# Patient Record
Sex: MALE | Race: OTHER | HISPANIC OR LATINO | URBAN - METROPOLITAN AREA
[De-identification: names, ages, dates, MRNs, and addresses within clinical notes are randomized per-mention and may not be internally consistent; named-entity substitution may affect disease eponyms.]

---

## 2017-05-28 ENCOUNTER — EMERGENCY (EMERGENCY)
Facility: HOSPITAL | Age: 37
LOS: 1 days | Discharge: HOME | End: 2017-05-28
Attending: EMERGENCY MEDICINE
Payer: MEDICAID

## 2017-05-28 VITALS
TEMPERATURE: 99 F | HEART RATE: 102 BPM | RESPIRATION RATE: 19 BRPM | WEIGHT: 134.92 LBS | DIASTOLIC BLOOD PRESSURE: 58 MMHG | HEIGHT: 67 IN | SYSTOLIC BLOOD PRESSURE: 104 MMHG | OXYGEN SATURATION: 99 %

## 2017-05-28 VITALS
DIASTOLIC BLOOD PRESSURE: 52 MMHG | TEMPERATURE: 100 F | OXYGEN SATURATION: 100 % | RESPIRATION RATE: 19 BRPM | SYSTOLIC BLOOD PRESSURE: 94 MMHG | HEART RATE: 80 BPM

## 2017-05-28 LAB
ALBUMIN SERPL ELPH-MCNC: 3.7 G/DL — SIGNIFICANT CHANGE UP (ref 3.5–5)
ALP SERPL-CCNC: 71 U/L — SIGNIFICANT CHANGE UP (ref 40–120)
ALT FLD-CCNC: 37 U/L DA — SIGNIFICANT CHANGE UP (ref 10–60)
ANION GAP SERPL CALC-SCNC: 6 MMOL/L — SIGNIFICANT CHANGE UP (ref 5–17)
APTT BLD: 29.9 SEC — SIGNIFICANT CHANGE UP (ref 27.5–37.4)
AST SERPL-CCNC: 23 U/L — SIGNIFICANT CHANGE UP (ref 10–40)
BILIRUB SERPL-MCNC: 0.5 MG/DL — SIGNIFICANT CHANGE UP (ref 0.2–1.2)
BUN SERPL-MCNC: 9 MG/DL — SIGNIFICANT CHANGE UP (ref 7–18)
CALCIUM SERPL-MCNC: 8.6 MG/DL — SIGNIFICANT CHANGE UP (ref 8.4–10.5)
CHLORIDE SERPL-SCNC: 104 MMOL/L — SIGNIFICANT CHANGE UP (ref 96–108)
CO2 SERPL-SCNC: 28 MMOL/L — SIGNIFICANT CHANGE UP (ref 22–31)
CREAT SERPL-MCNC: 1.05 MG/DL — SIGNIFICANT CHANGE UP (ref 0.5–1.3)
GLUCOSE SERPL-MCNC: 101 MG/DL — HIGH (ref 70–99)
HCT VFR BLD CALC: 42.3 % — SIGNIFICANT CHANGE UP (ref 39–50)
HGB BLD-MCNC: 14.4 G/DL — SIGNIFICANT CHANGE UP (ref 13–17)
INR BLD: 1.26 RATIO — HIGH (ref 0.88–1.16)
LIDOCAIN IGE QN: 244 U/L — SIGNIFICANT CHANGE UP (ref 73–393)
MAGNESIUM SERPL-MCNC: 1.7 MG/DL — SIGNIFICANT CHANGE UP (ref 1.6–2.6)
MCHC RBC-ENTMCNC: 32.7 PG — SIGNIFICANT CHANGE UP (ref 27–34)
MCHC RBC-ENTMCNC: 34 GM/DL — SIGNIFICANT CHANGE UP (ref 32–36)
MCV RBC AUTO: 96.2 FL — SIGNIFICANT CHANGE UP (ref 80–100)
PHOSPHATE SERPL-MCNC: 2.1 MG/DL — LOW (ref 2.5–4.5)
PLATELET # BLD AUTO: 141 K/UL — LOW (ref 150–400)
POTASSIUM SERPL-MCNC: 3.5 MMOL/L — SIGNIFICANT CHANGE UP (ref 3.5–5.3)
POTASSIUM SERPL-SCNC: 3.5 MMOL/L — SIGNIFICANT CHANGE UP (ref 3.5–5.3)
PROT SERPL-MCNC: 7.6 G/DL — SIGNIFICANT CHANGE UP (ref 6–8.3)
PROTHROM AB SERPL-ACNC: 13.8 SEC — HIGH (ref 9.8–12.7)
RBC # BLD: 4.4 M/UL — SIGNIFICANT CHANGE UP (ref 4.2–5.8)
RBC # FLD: 12.4 % — SIGNIFICANT CHANGE UP (ref 10.3–14.5)
SODIUM SERPL-SCNC: 138 MMOL/L — SIGNIFICANT CHANGE UP (ref 135–145)
WBC # BLD: 11.2 K/UL — HIGH (ref 3.8–10.5)
WBC # FLD AUTO: 11.2 K/UL — HIGH (ref 3.8–10.5)

## 2017-05-28 PROCEDURE — 84100 ASSAY OF PHOSPHORUS: CPT

## 2017-05-28 PROCEDURE — 74177 CT ABD & PELVIS W/CONTRAST: CPT

## 2017-05-28 PROCEDURE — 83735 ASSAY OF MAGNESIUM: CPT

## 2017-05-28 PROCEDURE — 85730 THROMBOPLASTIN TIME PARTIAL: CPT

## 2017-05-28 PROCEDURE — 85610 PROTHROMBIN TIME: CPT

## 2017-05-28 PROCEDURE — 74177 CT ABD & PELVIS W/CONTRAST: CPT | Mod: 26

## 2017-05-28 PROCEDURE — 83690 ASSAY OF LIPASE: CPT

## 2017-05-28 PROCEDURE — 99284 EMERGENCY DEPT VISIT MOD MDM: CPT | Mod: 25

## 2017-05-28 PROCEDURE — 85027 COMPLETE CBC AUTOMATED: CPT

## 2017-05-28 PROCEDURE — 96375 TX/PRO/DX INJ NEW DRUG ADDON: CPT | Mod: 76

## 2017-05-28 PROCEDURE — 96374 THER/PROPH/DIAG INJ IV PUSH: CPT | Mod: XU

## 2017-05-28 PROCEDURE — 80053 COMPREHEN METABOLIC PANEL: CPT

## 2017-05-28 RX ORDER — SODIUM CHLORIDE 9 MG/ML
1000 INJECTION INTRAMUSCULAR; INTRAVENOUS; SUBCUTANEOUS ONCE
Qty: 0 | Refills: 0 | Status: COMPLETED | OUTPATIENT
Start: 2017-05-28 | End: 2017-05-28

## 2017-05-28 RX ORDER — SODIUM,POTASSIUM PHOSPHATES 278-250MG
250 POWDER IN PACKET (EA) ORAL ONCE
Qty: 0 | Refills: 0 | Status: DISCONTINUED | OUTPATIENT
Start: 2017-05-28 | End: 2017-05-28

## 2017-05-28 RX ORDER — CIPROFLOXACIN LACTATE 400MG/40ML
500 VIAL (ML) INTRAVENOUS ONCE
Qty: 0 | Refills: 0 | Status: COMPLETED | OUTPATIENT
Start: 2017-05-28 | End: 2017-05-28

## 2017-05-28 RX ORDER — SODIUM,POTASSIUM PHOSPHATES 278-250MG
1 POWDER IN PACKET (EA) ORAL ONCE
Qty: 0 | Refills: 0 | Status: COMPLETED | OUTPATIENT
Start: 2017-05-28 | End: 2017-05-28

## 2017-05-28 RX ORDER — METRONIDAZOLE 500 MG
1 TABLET ORAL
Qty: 30 | Refills: 0
Start: 2017-05-28 | End: 2017-06-07

## 2017-05-28 RX ORDER — DIPHENHYDRAMINE HCL 50 MG
25 CAPSULE ORAL EVERY 4 HOURS
Qty: 0 | Refills: 0 | Status: DISCONTINUED | OUTPATIENT
Start: 2017-05-28 | End: 2017-06-01

## 2017-05-28 RX ORDER — ACETAMINOPHEN 500 MG
650 TABLET ORAL ONCE
Qty: 0 | Refills: 0 | Status: COMPLETED | OUTPATIENT
Start: 2017-05-28 | End: 2017-05-28

## 2017-05-28 RX ORDER — ONDANSETRON 8 MG/1
1 TABLET, FILM COATED ORAL
Qty: 30 | Refills: 0
Start: 2017-05-28

## 2017-05-28 RX ORDER — KETOROLAC TROMETHAMINE 30 MG/ML
60 SYRINGE (ML) INJECTION ONCE
Qty: 0 | Refills: 0 | Status: DISCONTINUED | OUTPATIENT
Start: 2017-05-28 | End: 2017-05-28

## 2017-05-28 RX ORDER — METRONIDAZOLE 500 MG
500 TABLET ORAL ONCE
Qty: 0 | Refills: 0 | Status: COMPLETED | OUTPATIENT
Start: 2017-05-28 | End: 2017-05-28

## 2017-05-28 RX ORDER — ONDANSETRON 8 MG/1
4 TABLET, FILM COATED ORAL ONCE
Qty: 0 | Refills: 0 | Status: COMPLETED | OUTPATIENT
Start: 2017-05-28 | End: 2017-05-28

## 2017-05-28 RX ORDER — FAMOTIDINE 10 MG/ML
20 INJECTION INTRAVENOUS ONCE
Qty: 0 | Refills: 0 | Status: COMPLETED | OUTPATIENT
Start: 2017-05-28 | End: 2017-05-28

## 2017-05-28 RX ORDER — MOXIFLOXACIN HYDROCHLORIDE TABLETS, 400 MG 400 MG/1
1 TABLET, FILM COATED ORAL
Qty: 20 | Refills: 0
Start: 2017-05-28 | End: 2017-06-07

## 2017-05-28 RX ORDER — METOCLOPRAMIDE HCL 10 MG
10 TABLET ORAL ONCE
Qty: 0 | Refills: 0 | Status: COMPLETED | OUTPATIENT
Start: 2017-05-28 | End: 2017-05-28

## 2017-05-28 RX ADMIN — Medication 650 MILLIGRAM(S): at 08:30

## 2017-05-28 RX ADMIN — SODIUM CHLORIDE 1000 MILLILITER(S): 9 INJECTION INTRAMUSCULAR; INTRAVENOUS; SUBCUTANEOUS at 04:43

## 2017-05-28 RX ADMIN — FAMOTIDINE 20 MILLIGRAM(S): 10 INJECTION INTRAVENOUS at 04:43

## 2017-05-28 RX ADMIN — Medication 10 MILLIGRAM(S): at 07:44

## 2017-05-28 RX ADMIN — Medication 1 TABLET(S): at 07:45

## 2017-05-28 RX ADMIN — Medication 500 MILLIGRAM(S): at 11:29

## 2017-05-28 RX ADMIN — Medication 650 MILLIGRAM(S): at 08:05

## 2017-05-28 RX ADMIN — Medication 500 MILLIGRAM(S): at 11:32

## 2017-05-28 RX ADMIN — ONDANSETRON 4 MILLIGRAM(S): 8 TABLET, FILM COATED ORAL at 11:32

## 2017-05-28 RX ADMIN — ONDANSETRON 4 MILLIGRAM(S): 8 TABLET, FILM COATED ORAL at 04:43

## 2017-05-28 NOTE — ED PROVIDER NOTE - PROGRESS NOTE DETAILS
Pt continues to c/o of severe epigastric. Febrile to 101F. Asking for Klonopin. Will proceed with CAT scan, give Tylenol, and IV Valium. Reassess. I, Ramila Culp, am scribing for and in the presence of Dr. Radha Peguero in the following sections: PROGRESS NOTES. be pharmacist called bce of qtc prolongation with celexa and cipro, I reviewed ekgs - prior qtx are 430, and 390.  no real concern for adverse reaction

## 2017-05-28 NOTE — ED PROVIDER NOTE - MEDICAL DECISION MAKING DETAILS
36 y/o M pt with abd pain, nausea, vomiting and diarrhea x yesterday. Will give IVF,  Zofran, Pepcid and reassess.

## 2017-05-28 NOTE — ED PROVIDER NOTE - OBJECTIVE STATEMENT
38 y/o M pt with PMHx of anxiety, depression and panic attack, presents to ED c/o epigastric abd pain with nausea, vomiting and diarrhea x yesterday. Pt notes also experiencing chest tightness but relates it to his anxiety. Pt states he has not been able to take anxiety medication due to vomiting. As per pt, pt was seen in the ED for similar Sx 1 year ago. Pt denies fever, chills, dysuria, urinary frequency, hematuria, or any other complaints. NKDA. CURRENT MEDICATIONS: Clonopin and Celexa

## 2017-05-28 NOTE — ED PROVIDER NOTE - NS ED MD SCRIBE ATTENDING SCRIBE SECTIONS
PAST MEDICAL/SURGICAL/SOCIAL HISTORY/HISTORY OF PRESENT ILLNESS/PHYSICAL EXAM/VITAL SIGNS( Pullset)/DISPOSITION/HIV/REVIEW OF SYSTEMS

## 2017-06-01 DIAGNOSIS — K29.70 GASTRITIS, UNSPECIFIED, WITHOUT BLEEDING: ICD-10-CM

## 2017-06-01 DIAGNOSIS — F41.9 ANXIETY DISORDER, UNSPECIFIED: ICD-10-CM

## 2017-06-01 DIAGNOSIS — F41.0 PANIC DISORDER [EPISODIC PAROXYSMAL ANXIETY]: ICD-10-CM

## 2017-06-01 DIAGNOSIS — K52.82 EOSINOPHILIC COLITIS: ICD-10-CM

## 2017-06-01 DIAGNOSIS — F32.9 MAJOR DEPRESSIVE DISORDER, SINGLE EPISODE, UNSPECIFIED: ICD-10-CM

## 2019-09-15 ENCOUNTER — TRANSCRIPTION ENCOUNTER (OUTPATIENT)
Age: 39
End: 2019-09-15

## 2020-02-07 ENCOUNTER — EMERGENCY (EMERGENCY)
Facility: HOSPITAL | Age: 40
LOS: 1 days | Discharge: HOME | End: 2020-02-07
Attending: EMERGENCY MEDICINE
Payer: MEDICAID

## 2020-02-07 VITALS
DIASTOLIC BLOOD PRESSURE: 89 MMHG | TEMPERATURE: 98 F | WEIGHT: 147.05 LBS | SYSTOLIC BLOOD PRESSURE: 134 MMHG | OXYGEN SATURATION: 98 % | HEART RATE: 108 BPM | RESPIRATION RATE: 19 BRPM | HEIGHT: 67 IN

## 2020-02-07 LAB
ALBUMIN SERPL ELPH-MCNC: 4.3 G/DL — SIGNIFICANT CHANGE UP (ref 3.5–5)
ALP SERPL-CCNC: 78 U/L — SIGNIFICANT CHANGE UP (ref 40–120)
ALT FLD-CCNC: 51 U/L DA — SIGNIFICANT CHANGE UP (ref 10–60)
ANION GAP SERPL CALC-SCNC: 9 MMOL/L — SIGNIFICANT CHANGE UP (ref 5–17)
AST SERPL-CCNC: 27 U/L — SIGNIFICANT CHANGE UP (ref 10–40)
BASOPHILS # BLD AUTO: 0 K/UL — SIGNIFICANT CHANGE UP (ref 0–0.2)
BASOPHILS NFR BLD AUTO: 0 % — SIGNIFICANT CHANGE UP (ref 0–2)
BILIRUB SERPL-MCNC: 1 MG/DL — SIGNIFICANT CHANGE UP (ref 0.2–1.2)
BUN SERPL-MCNC: 13 MG/DL — SIGNIFICANT CHANGE UP (ref 7–18)
CALCIUM SERPL-MCNC: 9 MG/DL — SIGNIFICANT CHANGE UP (ref 8.4–10.5)
CHLORIDE SERPL-SCNC: 105 MMOL/L — SIGNIFICANT CHANGE UP (ref 96–108)
CO2 SERPL-SCNC: 25 MMOL/L — SIGNIFICANT CHANGE UP (ref 22–31)
CREAT SERPL-MCNC: 0.97 MG/DL — SIGNIFICANT CHANGE UP (ref 0.5–1.3)
EOSINOPHIL # BLD AUTO: 0.11 K/UL — SIGNIFICANT CHANGE UP (ref 0–0.5)
EOSINOPHIL NFR BLD AUTO: 1 % — SIGNIFICANT CHANGE UP (ref 0–6)
GLUCOSE SERPL-MCNC: 97 MG/DL — SIGNIFICANT CHANGE UP (ref 70–99)
HCT VFR BLD CALC: 46.9 % — SIGNIFICANT CHANGE UP (ref 39–50)
HGB BLD-MCNC: 15.9 G/DL — SIGNIFICANT CHANGE UP (ref 13–17)
LACTATE SERPL-SCNC: 1.3 MMOL/L — SIGNIFICANT CHANGE UP (ref 0.7–2)
LIDOCAIN IGE QN: 129 U/L — SIGNIFICANT CHANGE UP (ref 73–393)
LYMPHOCYTES # BLD AUTO: 0.22 K/UL — LOW (ref 1–3.3)
LYMPHOCYTES # BLD AUTO: 2 % — LOW (ref 13–44)
MANUAL SMEAR VERIFICATION: SIGNIFICANT CHANGE UP
MCHC RBC-ENTMCNC: 31.5 PG — SIGNIFICANT CHANGE UP (ref 27–34)
MCHC RBC-ENTMCNC: 33.9 GM/DL — SIGNIFICANT CHANGE UP (ref 32–36)
MCV RBC AUTO: 92.9 FL — SIGNIFICANT CHANGE UP (ref 80–100)
MONOCYTES # BLD AUTO: 0.87 K/UL — SIGNIFICANT CHANGE UP (ref 0–0.9)
MONOCYTES NFR BLD AUTO: 8 % — SIGNIFICANT CHANGE UP (ref 2–14)
NEUTROPHILS # BLD AUTO: 9.72 K/UL — HIGH (ref 1.8–7.4)
NEUTROPHILS NFR BLD AUTO: 85 % — HIGH (ref 43–77)
NEUTS BAND # BLD: 4 % — SIGNIFICANT CHANGE UP (ref 0–8)
NRBC # BLD: 0 /100 — SIGNIFICANT CHANGE UP (ref 0–0)
PLAT MORPH BLD: NORMAL — SIGNIFICANT CHANGE UP
PLATELET # BLD AUTO: 202 K/UL — SIGNIFICANT CHANGE UP (ref 150–400)
POTASSIUM SERPL-MCNC: 3.6 MMOL/L — SIGNIFICANT CHANGE UP (ref 3.5–5.3)
POTASSIUM SERPL-SCNC: 3.6 MMOL/L — SIGNIFICANT CHANGE UP (ref 3.5–5.3)
PROT SERPL-MCNC: 8.5 G/DL — HIGH (ref 6–8.3)
RBC # BLD: 5.05 M/UL — SIGNIFICANT CHANGE UP (ref 4.2–5.8)
RBC # FLD: 11.6 % — SIGNIFICANT CHANGE UP (ref 10.3–14.5)
RBC BLD AUTO: NORMAL — SIGNIFICANT CHANGE UP
SODIUM SERPL-SCNC: 139 MMOL/L — SIGNIFICANT CHANGE UP (ref 135–145)
WBC # BLD: 10.92 K/UL — HIGH (ref 3.8–10.5)
WBC # FLD AUTO: 10.92 K/UL — HIGH (ref 3.8–10.5)

## 2020-02-07 PROCEDURE — 85027 COMPLETE CBC AUTOMATED: CPT

## 2020-02-07 PROCEDURE — 80053 COMPREHEN METABOLIC PANEL: CPT

## 2020-02-07 PROCEDURE — 36415 COLL VENOUS BLD VENIPUNCTURE: CPT

## 2020-02-07 PROCEDURE — 83605 ASSAY OF LACTIC ACID: CPT

## 2020-02-07 PROCEDURE — 99284 EMERGENCY DEPT VISIT MOD MDM: CPT

## 2020-02-07 PROCEDURE — 99284 EMERGENCY DEPT VISIT MOD MDM: CPT | Mod: 25

## 2020-02-07 PROCEDURE — 96375 TX/PRO/DX INJ NEW DRUG ADDON: CPT

## 2020-02-07 PROCEDURE — 83690 ASSAY OF LIPASE: CPT

## 2020-02-07 PROCEDURE — 96374 THER/PROPH/DIAG INJ IV PUSH: CPT

## 2020-02-07 RX ORDER — KETOROLAC TROMETHAMINE 30 MG/ML
15 SYRINGE (ML) INJECTION ONCE
Refills: 0 | Status: DISCONTINUED | OUTPATIENT
Start: 2020-02-07 | End: 2020-02-07

## 2020-02-07 RX ORDER — SODIUM CHLORIDE 9 MG/ML
1000 INJECTION INTRAMUSCULAR; INTRAVENOUS; SUBCUTANEOUS ONCE
Refills: 0 | Status: COMPLETED | OUTPATIENT
Start: 2020-02-07 | End: 2020-02-07

## 2020-02-07 RX ORDER — ONDANSETRON 8 MG/1
4 TABLET, FILM COATED ORAL ONCE
Refills: 0 | Status: COMPLETED | OUTPATIENT
Start: 2020-02-07 | End: 2020-02-07

## 2020-02-07 RX ADMIN — Medication 15 MILLIGRAM(S): at 21:19

## 2020-02-07 RX ADMIN — ONDANSETRON 4 MILLIGRAM(S): 8 TABLET, FILM COATED ORAL at 21:19

## 2020-02-07 RX ADMIN — SODIUM CHLORIDE 1000 MILLILITER(S): 9 INJECTION INTRAMUSCULAR; INTRAVENOUS; SUBCUTANEOUS at 21:24

## 2020-02-07 RX ADMIN — Medication 30 MILLILITER(S): at 21:20

## 2020-02-07 NOTE — ED PROVIDER NOTE - PROGRESS NOTE DETAILS
[place]: tolerated po.  work up shows no lytes abnormality  dx gastroenteritis. slowly advance diet. left ambulatory.  return precautions given.

## 2020-02-07 NOTE — ED ADULT NURSE NOTE - OBJECTIVE STATEMENT
pt is a 38 y/o male  w/ c/o abdominal pain  abdomen soft  non distended  + BS noted . states he is feeling nauseaus and vomiting.

## 2020-02-07 NOTE — ED PROVIDER NOTE - PATIENT PORTAL LINK FT
You can access the FollowMyHealth Patient Portal offered by [facility] by registering at the following website: [email or web address]. By joining [employer]’s FollowMyHealth portal, you will also be able to view your health information using other applications (apps) compatible with our system.

## 2020-02-07 NOTE — ED PROVIDER NOTE - CLINICAL SUMMARY MEDICAL DECISION MAKING FREE TEXT BOX
39 yr old male with hx of anxiety presents to ed c/o diarrhea non bloody with vomiting nbnb now abd cramping since am.  daughter with sx first.  + myalgia, no cough, no travel , no drug use, no etoh, no abx use.    gastroenteritis.  hydrate, labs, zofran, toradol, re-assess

## 2020-02-07 NOTE — ED PROVIDER NOTE - ENMT, MLM
Airway patent, Nasal mucosa clear. Mouth with normal mucosa. Throat has no vesicles, no oropharyngeal exudates and uvula is midline. no thrush

## 2020-02-07 NOTE — ED PROVIDER NOTE - OBJECTIVE STATEMENT
39 yr old male with hx of anxiety presents to ed c/o diarrhea non bloody with vomiting nbnb now abd cramping since am.  daughter with sx first.  + myalgia, no cough, no travel , no drug use, no etoh, no abx use.

## 2020-02-07 NOTE — ED ADULT NURSE NOTE - NSIMPLEMENTINTERV_GEN_ALL_ED
Implemented All Universal Safety Interventions:  [place] to call system. Call bell, personal items and telephone within reach. Instruct patient to call for assistance. Room bathroom lighting operational. Non-slip footwear when patient is off stretcher. Physically safe environment: no spills, clutter or unnecessary equipment. Stretcher in lowest position, wheels locked, appropriate side rails in place.

## 2021-04-03 ENCOUNTER — INPATIENT (INPATIENT)
Facility: HOSPITAL | Age: 41
LOS: 1 days | Discharge: HOME | DRG: 343 | End: 2021-04-05
Attending: SURGERY | Admitting: SURGERY
Payer: MEDICAID

## 2021-04-03 ENCOUNTER — TRANSCRIPTION ENCOUNTER (OUTPATIENT)
Age: 41
End: 2021-04-03

## 2021-04-03 VITALS
RESPIRATION RATE: 18 BRPM | WEIGHT: 154.98 LBS | TEMPERATURE: 98 F | SYSTOLIC BLOOD PRESSURE: 123 MMHG | OXYGEN SATURATION: 98 % | HEIGHT: 67 IN | DIASTOLIC BLOOD PRESSURE: 74 MMHG | HEART RATE: 64 BPM

## 2021-04-03 PROCEDURE — 99285 EMERGENCY DEPT VISIT HI MDM: CPT

## 2021-04-03 RX ORDER — SODIUM CHLORIDE 9 MG/ML
1000 INJECTION INTRAMUSCULAR; INTRAVENOUS; SUBCUTANEOUS ONCE
Refills: 0 | Status: COMPLETED | OUTPATIENT
Start: 2021-04-03 | End: 2021-04-03

## 2021-04-03 RX ORDER — ONDANSETRON 8 MG/1
4 TABLET, FILM COATED ORAL ONCE
Refills: 0 | Status: COMPLETED | OUTPATIENT
Start: 2021-04-03 | End: 2021-04-03

## 2021-04-03 RX ORDER — FAMOTIDINE 10 MG/ML
20 INJECTION INTRAVENOUS ONCE
Refills: 0 | Status: COMPLETED | OUTPATIENT
Start: 2021-04-03 | End: 2021-04-03

## 2021-04-03 RX ORDER — SUCRALFATE 1 G
1 TABLET ORAL ONCE
Refills: 0 | Status: COMPLETED | OUTPATIENT
Start: 2021-04-03 | End: 2021-04-03

## 2021-04-04 ENCOUNTER — RESULT REVIEW (OUTPATIENT)
Age: 41
End: 2021-04-04

## 2021-04-04 ENCOUNTER — TRANSCRIPTION ENCOUNTER (OUTPATIENT)
Age: 41
End: 2021-04-04

## 2021-04-04 DIAGNOSIS — K35.80 UNSPECIFIED ACUTE APPENDICITIS: ICD-10-CM

## 2021-04-04 LAB
ALBUMIN SERPL ELPH-MCNC: 3.6 G/DL — SIGNIFICANT CHANGE UP (ref 3.5–5)
ALP SERPL-CCNC: 74 U/L — SIGNIFICANT CHANGE UP (ref 40–120)
ALT FLD-CCNC: 41 U/L DA — SIGNIFICANT CHANGE UP (ref 10–60)
ANION GAP SERPL CALC-SCNC: 5 MMOL/L — SIGNIFICANT CHANGE UP (ref 5–17)
APTT BLD: 28.3 SEC — SIGNIFICANT CHANGE UP (ref 27.5–35.5)
AST SERPL-CCNC: 22 U/L — SIGNIFICANT CHANGE UP (ref 10–40)
BASOPHILS # BLD AUTO: 0.04 K/UL — SIGNIFICANT CHANGE UP (ref 0–0.2)
BASOPHILS NFR BLD AUTO: 0.3 % — SIGNIFICANT CHANGE UP (ref 0–2)
BILIRUB SERPL-MCNC: 0.2 MG/DL — SIGNIFICANT CHANGE UP (ref 0.2–1.2)
BUN SERPL-MCNC: 18 MG/DL — SIGNIFICANT CHANGE UP (ref 7–18)
CALCIUM SERPL-MCNC: 8.7 MG/DL — SIGNIFICANT CHANGE UP (ref 8.4–10.5)
CHLORIDE SERPL-SCNC: 103 MMOL/L — SIGNIFICANT CHANGE UP (ref 96–108)
CO2 SERPL-SCNC: 29 MMOL/L — SIGNIFICANT CHANGE UP (ref 22–31)
CREAT SERPL-MCNC: 1.03 MG/DL — SIGNIFICANT CHANGE UP (ref 0.5–1.3)
EOSINOPHIL # BLD AUTO: 0.25 K/UL — SIGNIFICANT CHANGE UP (ref 0–0.5)
EOSINOPHIL NFR BLD AUTO: 2.1 % — SIGNIFICANT CHANGE UP (ref 0–6)
GLUCOSE SERPL-MCNC: 89 MG/DL — SIGNIFICANT CHANGE UP (ref 70–99)
HCT VFR BLD CALC: 42.2 % — SIGNIFICANT CHANGE UP (ref 39–50)
HGB BLD-MCNC: 14.2 G/DL — SIGNIFICANT CHANGE UP (ref 13–17)
IMM GRANULOCYTES NFR BLD AUTO: 0.3 % — SIGNIFICANT CHANGE UP (ref 0–1.5)
INR BLD: 1.09 RATIO — SIGNIFICANT CHANGE UP (ref 0.88–1.16)
LIDOCAIN IGE QN: 223 U/L — SIGNIFICANT CHANGE UP (ref 73–393)
LYMPHOCYTES # BLD AUTO: 16.8 % — SIGNIFICANT CHANGE UP (ref 13–44)
LYMPHOCYTES # BLD AUTO: 2.01 K/UL — SIGNIFICANT CHANGE UP (ref 1–3.3)
MCHC RBC-ENTMCNC: 31.4 PG — SIGNIFICANT CHANGE UP (ref 27–34)
MCHC RBC-ENTMCNC: 33.6 GM/DL — SIGNIFICANT CHANGE UP (ref 32–36)
MCV RBC AUTO: 93.4 FL — SIGNIFICANT CHANGE UP (ref 80–100)
MONOCYTES # BLD AUTO: 0.76 K/UL — SIGNIFICANT CHANGE UP (ref 0–0.9)
MONOCYTES NFR BLD AUTO: 6.4 % — SIGNIFICANT CHANGE UP (ref 2–14)
NEUTROPHILS # BLD AUTO: 8.87 K/UL — HIGH (ref 1.8–7.4)
NEUTROPHILS NFR BLD AUTO: 74.1 % — SIGNIFICANT CHANGE UP (ref 43–77)
NRBC # BLD: 0 /100 WBCS — SIGNIFICANT CHANGE UP (ref 0–0)
PLATELET # BLD AUTO: 210 K/UL — SIGNIFICANT CHANGE UP (ref 150–400)
POTASSIUM SERPL-MCNC: 3.6 MMOL/L — SIGNIFICANT CHANGE UP (ref 3.5–5.3)
POTASSIUM SERPL-SCNC: 3.6 MMOL/L — SIGNIFICANT CHANGE UP (ref 3.5–5.3)
PROT SERPL-MCNC: 7.9 G/DL — SIGNIFICANT CHANGE UP (ref 6–8.3)
PROTHROM AB SERPL-ACNC: 12.9 SEC — SIGNIFICANT CHANGE UP (ref 10.6–13.6)
RBC # BLD: 4.52 M/UL — SIGNIFICANT CHANGE UP (ref 4.2–5.8)
RBC # FLD: 11.5 % — SIGNIFICANT CHANGE UP (ref 10.3–14.5)
SARS-COV-2 RNA SPEC QL NAA+PROBE: SIGNIFICANT CHANGE UP
SODIUM SERPL-SCNC: 137 MMOL/L — SIGNIFICANT CHANGE UP (ref 135–145)
WBC # BLD: 11.96 K/UL — HIGH (ref 3.8–10.5)
WBC # FLD AUTO: 11.96 K/UL — HIGH (ref 3.8–10.5)

## 2021-04-04 PROCEDURE — 93010 ELECTROCARDIOGRAM REPORT: CPT

## 2021-04-04 PROCEDURE — 99222 1ST HOSP IP/OBS MODERATE 55: CPT | Mod: 57

## 2021-04-04 PROCEDURE — 74177 CT ABD & PELVIS W/CONTRAST: CPT | Mod: 26,MA

## 2021-04-04 PROCEDURE — 44970 LAPAROSCOPY APPENDECTOMY: CPT

## 2021-04-04 PROCEDURE — 88304 TISSUE EXAM BY PATHOLOGIST: CPT | Mod: 26

## 2021-04-04 PROCEDURE — 44970 LAPAROSCOPY APPENDECTOMY: CPT | Mod: AS

## 2021-04-04 RX ORDER — SODIUM CHLORIDE 9 MG/ML
1000 INJECTION, SOLUTION INTRAVENOUS
Refills: 0 | Status: DISCONTINUED | OUTPATIENT
Start: 2021-04-04 | End: 2021-04-04

## 2021-04-04 RX ORDER — CEFOTETAN DISODIUM 1 G
2 VIAL (EA) INJECTION ONCE
Refills: 0 | Status: COMPLETED | OUTPATIENT
Start: 2021-04-04 | End: 2021-04-04

## 2021-04-04 RX ORDER — ONDANSETRON 8 MG/1
4 TABLET, FILM COATED ORAL ONCE
Refills: 0 | Status: DISCONTINUED | OUTPATIENT
Start: 2021-04-04 | End: 2021-04-04

## 2021-04-04 RX ORDER — DEXTROSE MONOHYDRATE, SODIUM CHLORIDE, AND POTASSIUM CHLORIDE 50; .745; 4.5 G/1000ML; G/1000ML; G/1000ML
1000 INJECTION, SOLUTION INTRAVENOUS
Refills: 0 | Status: DISCONTINUED | OUTPATIENT
Start: 2021-04-04 | End: 2021-04-04

## 2021-04-04 RX ORDER — ACETAMINOPHEN 500 MG
1000 TABLET ORAL ONCE
Refills: 0 | Status: COMPLETED | OUTPATIENT
Start: 2021-04-04 | End: 2021-04-04

## 2021-04-04 RX ORDER — HYDROMORPHONE HYDROCHLORIDE 2 MG/ML
0.5 INJECTION INTRAMUSCULAR; INTRAVENOUS; SUBCUTANEOUS
Refills: 0 | Status: DISCONTINUED | OUTPATIENT
Start: 2021-04-04 | End: 2021-04-04

## 2021-04-04 RX ORDER — CEFOTETAN DISODIUM 1 G
1 VIAL (EA) INJECTION EVERY 12 HOURS
Refills: 0 | Status: DISCONTINUED | OUTPATIENT
Start: 2021-04-04 | End: 2021-04-05

## 2021-04-04 RX ORDER — ONDANSETRON 8 MG/1
4 TABLET, FILM COATED ORAL EVERY 6 HOURS
Refills: 0 | Status: DISCONTINUED | OUTPATIENT
Start: 2021-04-04 | End: 2021-04-04

## 2021-04-04 RX ORDER — MORPHINE SULFATE 50 MG/1
2 CAPSULE, EXTENDED RELEASE ORAL EVERY 6 HOURS
Refills: 0 | Status: DISCONTINUED | OUTPATIENT
Start: 2021-04-04 | End: 2021-04-05

## 2021-04-04 RX ORDER — KETOROLAC TROMETHAMINE 30 MG/ML
30 SYRINGE (ML) INJECTION EVERY 6 HOURS
Refills: 0 | Status: DISCONTINUED | OUTPATIENT
Start: 2021-04-04 | End: 2021-04-04

## 2021-04-04 RX ORDER — ENOXAPARIN SODIUM 100 MG/ML
40 INJECTION SUBCUTANEOUS DAILY
Refills: 0 | Status: DISCONTINUED | OUTPATIENT
Start: 2021-04-04 | End: 2021-04-05

## 2021-04-04 RX ORDER — ONDANSETRON 8 MG/1
4 TABLET, FILM COATED ORAL EVERY 6 HOURS
Refills: 0 | Status: DISCONTINUED | OUTPATIENT
Start: 2021-04-04 | End: 2021-04-05

## 2021-04-04 RX ORDER — KETOROLAC TROMETHAMINE 30 MG/ML
30 SYRINGE (ML) INJECTION EVERY 6 HOURS
Refills: 0 | Status: DISCONTINUED | OUTPATIENT
Start: 2021-04-04 | End: 2021-04-05

## 2021-04-04 RX ORDER — SODIUM CHLORIDE 9 MG/ML
1000 INJECTION, SOLUTION INTRAVENOUS
Refills: 0 | Status: DISCONTINUED | OUTPATIENT
Start: 2021-04-04 | End: 2021-04-05

## 2021-04-04 RX ORDER — MORPHINE SULFATE 50 MG/1
4 CAPSULE, EXTENDED RELEASE ORAL ONCE
Refills: 0 | Status: DISCONTINUED | OUTPATIENT
Start: 2021-04-04 | End: 2021-04-04

## 2021-04-04 RX ORDER — FENTANYL CITRATE 50 UG/ML
25 INJECTION INTRAVENOUS
Refills: 0 | Status: DISCONTINUED | OUTPATIENT
Start: 2021-04-04 | End: 2021-04-04

## 2021-04-04 RX ADMIN — Medication 100 GRAM(S): at 03:29

## 2021-04-04 RX ADMIN — SODIUM CHLORIDE 999 MILLILITER(S): 9 INJECTION, SOLUTION INTRAVENOUS at 05:13

## 2021-04-04 RX ADMIN — Medication 30 MILLILITER(S): at 00:00

## 2021-04-04 RX ADMIN — Medication 2 GRAM(S): at 06:37

## 2021-04-04 RX ADMIN — MORPHINE SULFATE 4 MILLIGRAM(S): 50 CAPSULE, EXTENDED RELEASE ORAL at 00:48

## 2021-04-04 RX ADMIN — Medication 30 MILLIGRAM(S): at 06:11

## 2021-04-04 RX ADMIN — MORPHINE SULFATE 2 MILLIGRAM(S): 50 CAPSULE, EXTENDED RELEASE ORAL at 14:09

## 2021-04-04 RX ADMIN — FAMOTIDINE 20 MILLIGRAM(S): 10 INJECTION INTRAVENOUS at 00:00

## 2021-04-04 RX ADMIN — Medication 100 GRAM(S): at 17:28

## 2021-04-04 RX ADMIN — MORPHINE SULFATE 2 MILLIGRAM(S): 50 CAPSULE, EXTENDED RELEASE ORAL at 21:09

## 2021-04-04 RX ADMIN — Medication 400 MILLIGRAM(S): at 02:33

## 2021-04-04 RX ADMIN — DEXTROSE MONOHYDRATE, SODIUM CHLORIDE, AND POTASSIUM CHLORIDE 110 MILLILITER(S): 50; .745; 4.5 INJECTION, SOLUTION INTRAVENOUS at 06:12

## 2021-04-04 RX ADMIN — SODIUM CHLORIDE 1000 MILLILITER(S): 9 INJECTION INTRAMUSCULAR; INTRAVENOUS; SUBCUTANEOUS at 00:00

## 2021-04-04 RX ADMIN — ONDANSETRON 4 MILLIGRAM(S): 8 TABLET, FILM COATED ORAL at 00:00

## 2021-04-04 RX ADMIN — Medication 1000 MILLIGRAM(S): at 03:29

## 2021-04-04 RX ADMIN — MORPHINE SULFATE 2 MILLIGRAM(S): 50 CAPSULE, EXTENDED RELEASE ORAL at 14:30

## 2021-04-04 RX ADMIN — MORPHINE SULFATE 4 MILLIGRAM(S): 50 CAPSULE, EXTENDED RELEASE ORAL at 00:49

## 2021-04-04 RX ADMIN — Medication 1000 MILLIGRAM(S): at 03:06

## 2021-04-04 RX ADMIN — Medication 30 MILLIGRAM(S): at 06:13

## 2021-04-04 RX ADMIN — Medication 1 GRAM(S): at 00:00

## 2021-04-04 RX ADMIN — MORPHINE SULFATE 2 MILLIGRAM(S): 50 CAPSULE, EXTENDED RELEASE ORAL at 21:25

## 2021-04-04 NOTE — DISCHARGE NOTE PROVIDER - NSDCCPCAREPLAN_GEN_ALL_CORE_FT
PRINCIPAL DISCHARGE DIAGNOSIS  Diagnosis: Acute appendicitis, unspecified acute appendicitis type  Assessment and Plan of Treatment: Please follow-up with your surgeon in 1 week. Drink plenty of fluids and rest as needed. Call for any fever over 101, nausea, vomiting, severe pain, no passing of gas or bowel movement.   DIET  You may resume your regular diet as normal.   SURGICAL SITES  Remove outer dressing and keep white steri-strips in place allowing them to fall off on their own. You may shower 48 hours post-operatively but do not bathe or soak in the water for 1-2 weeks; pat dry. If you notice any signs of surgical site infection (ie. redness, swelling, pain, pus drainage), please seek medical care immediately.   ACTIVITY  Do not lift anything heavier than 10 pounds for 2 weeks and avoid strenuous activity for 4-6 weeks.   PAIN CONTROL  You may take Motrin 600mg (with food) or Tylenol 650mg as needed for mild pain. Stagger one medication 3 hours after the other for maximum pain control. Maximum daily dose of Tylenol should not exceed 4grams/day.

## 2021-04-04 NOTE — H&P ADULT - NSHPPHYSICALEXAM_GEN_ALL_CORE
Vital Signs Last 24 Hrs  T(C): 36.4 (04 Apr 2021 06:01), Max: 36.6 (03 Apr 2021 22:45)  T(F): 97.6 (04 Apr 2021 06:01), Max: 97.8 (03 Apr 2021 22:45)  HR: 61 (04 Apr 2021 06:01) (61 - 64)  BP: 102/61 (04 Apr 2021 06:01) (102/61 - 123/74)  BP(mean): --  RR: 16 (04 Apr 2021 06:01) (16 - 18)  SpO2: 97% (04 Apr 2021 06:01) (97% - 98%)    General:  A&Ox3,Appears stated age, No acute distress,  Head: NC/AT  EENT: PERRLA. EOMI. Conjunctiva and sclera clear. Pharynx clear.  Neck: Supple. No JVD  Lungs: CTA B/l. Nonlabored Respirations  CV: +S1S2, RRR  Abdomen: Soft, Nondistended,  + RLQ McBurney's point tenderness, no guarding, no rebound  Extremities: Warm and well perfused. 2+ peripheral pulses b/l. Calf soft, nontender b/l. No pedal edema.

## 2021-04-04 NOTE — H&P ADULT - NSHPLABSRESULTS_GEN_ALL_CORE
14.2   11.96 )-----------( 210      ( 03 Apr 2021 23:56 )             42.2   04-03    137  |  103  |  18  ----------------------------<  89  3.6   |  29  |  1.03    Ca    8.7      03 Apr 2021 23:56    TPro  7.9  /  Alb  3.6  /  TBili  0.2  /  DBili  x   /  AST  22  /  ALT  41  /  AlkPhos  74  04-03      < from: CT Abdomen and Pelvis w/ IV Cont (04.04.21 @ 02:01) >    FINDINGS:  LOWER CHEST: Within normal limits.    LIVER: Focal hepatic steatosis adjacent to the falciform ligament.  BILE DUCTS: Normal caliber.  GALLBLADDER: Within normal limits.  SPLEEN: Within normal limits.  PANCREAS: Within normal limits.  ADRENALS: Within normal limits.  KIDNEYS/URETERS: Within normal limits.    BLADDER: Decompressed, limiting evaluation  REPRODUCTIVE ORGANS: Prostate is normal in size    BOWEL: No bowel obstruction. Appendix is fluid-filled, mildly dilated and hyperemic with adjacent fat stranding. No abscess or extraluminal air.  PERITONEUM: Trace ascites in the pelvis  VESSELS: Normal caliber  RETROPERITONEUM/LYMPH NODES: No hemorrhage or adenopathy  ABDOMINAL WALL: Unremarkable  BONES: No acute osseous abnormality    IMPRESSION:  Acute, uncomplicated appendicitis.      < end of copied text >

## 2021-04-04 NOTE — H&P ADULT - HISTORY OF PRESENT ILLNESS
41 y/o male h/o anxiety, depression, gastritis, presents with epigastric pain starting approximately 12 hours ago. Pt noting nausea. No V/D. No chest pain. No lower abdominal pain. 39 y/o male with PMHx of anxiety, depression, presents to the ED with Abdominal Pain x 2 days . Pain started suddenly first located in the periumbilical area but now residing in the RLQ area described as sharp , persistent and disabling associated with nausea . Denies fever, chills, vomiting, diarrhea SOB, chest pain or any other complaints at this time .

## 2021-04-04 NOTE — ED PROVIDER NOTE - CLINICAL SUMMARY MEDICAL DECISION MAKING FREE TEXT BOX
Pt presenting with epigastric abdominal pain. Will check labs. Will assess for LFT elevations and pancreatitis. Given pain control. Will consider imaging if pain persists or if labs are abnormal.

## 2021-04-04 NOTE — DISCHARGE NOTE PROVIDER - HOSPITAL COURSE
40M with PMHx of anxiety, depression, presents to the ED with Abdominal Pain x 2 days . Pain started suddenly first located in the periumbilical area but now residing in the RLQ area described as sharp , persistent and disabling associated with nausea . Denies fever, chills, vomiting, diarrhea SOB, chest pain or any other complaints at this time. Pt underwent a CT scan which showed acute uncomplicated appendicitis. Pt was admitted to the hospital and underwent a laparoscopic appendectomy 4/4/21. Pt tolerated the procedure well. Diet was advanced and tolerated and patient was stable for discharge the following day with appropriate follow-up.

## 2021-04-04 NOTE — ED PROVIDER NOTE - OBJECTIVE STATEMENT
39 y/o male h/o anxiety, depression, gastritis, presents with epigastric pain starting approximately 12 hours ago. Pt noting nausea. No V/D. No chest pain. No lower abdominal pain.

## 2021-04-04 NOTE — ED ADULT NURSE REASSESSMENT NOTE - NS ED NURSE REASSESS COMMENT FT1
6 am pt complaining of pain in the abdomen , toradol given with good relief . LR bolus finished . iv started with ds.45 fluid infusing good . pt sleeping now , no distress noted ,report given to day shift rn

## 2021-04-04 NOTE — DISCHARGE NOTE PROVIDER - NSDCMRMEDTOKEN_GEN_ALL_CORE_FT
Klonopin 1 mg oral tablet:  orally  X 2 DAY    ketorolac 10 mg oral tablet: 1 tab(s) orally 4 times a day, As Needed -for moderate pain MDD:4   Klonopin 1 mg oral tablet:  orally  X 2 DAY

## 2021-04-04 NOTE — DISCHARGE NOTE PROVIDER - CARE PROVIDER_API CALL
Dipak Singh  General Surgery  [address] [address] [place] [address]  Phone: [phone]  Fax: [phone]  Follow Up Time:

## 2021-04-04 NOTE — H&P ADULT - ASSESSMENT
39 y/o male with PMHx of anxiety, depression, with RLQ Abdominal Pain x 2 days c/w Acute uncomplicated Appendicitis. Afebrile, Mild leucocytosis. Will require surgical intervention     Admit to Dr Singh surgical service   NPO, IVF  IV ABx  pain control   f/up labs   DVT PPx

## 2021-04-05 ENCOUNTER — TRANSCRIPTION ENCOUNTER (OUTPATIENT)
Age: 41
End: 2021-04-05

## 2021-04-05 VITALS
OXYGEN SATURATION: 99 % | DIASTOLIC BLOOD PRESSURE: 66 MMHG | SYSTOLIC BLOOD PRESSURE: 108 MMHG | TEMPERATURE: 98 F | HEART RATE: 75 BPM | RESPIRATION RATE: 17 BRPM

## 2021-04-05 LAB
ANION GAP SERPL CALC-SCNC: 9 MMOL/L — SIGNIFICANT CHANGE UP (ref 5–17)
BASOPHILS # BLD AUTO: 0.02 K/UL — SIGNIFICANT CHANGE UP (ref 0–0.2)
BASOPHILS NFR BLD AUTO: 0.3 % — SIGNIFICANT CHANGE UP (ref 0–2)
BUN SERPL-MCNC: 8 MG/DL — SIGNIFICANT CHANGE UP (ref 7–18)
CALCIUM SERPL-MCNC: 9 MG/DL — SIGNIFICANT CHANGE UP (ref 8.4–10.5)
CHLORIDE SERPL-SCNC: 105 MMOL/L — SIGNIFICANT CHANGE UP (ref 96–108)
CO2 SERPL-SCNC: 25 MMOL/L — SIGNIFICANT CHANGE UP (ref 22–31)
COVID-19 SPIKE DOMAIN AB INTERP: NEGATIVE — SIGNIFICANT CHANGE UP
COVID-19 SPIKE DOMAIN ANTIBODY RESULT: 0.4 U/ML — SIGNIFICANT CHANGE UP
CREAT SERPL-MCNC: 1.04 MG/DL — SIGNIFICANT CHANGE UP (ref 0.5–1.3)
EOSINOPHIL # BLD AUTO: 0.27 K/UL — SIGNIFICANT CHANGE UP (ref 0–0.5)
EOSINOPHIL NFR BLD AUTO: 4.6 % — SIGNIFICANT CHANGE UP (ref 0–6)
GLUCOSE SERPL-MCNC: 88 MG/DL — SIGNIFICANT CHANGE UP (ref 70–99)
HCT VFR BLD CALC: 40.9 % — SIGNIFICANT CHANGE UP (ref 39–50)
HGB BLD-MCNC: 13.7 G/DL — SIGNIFICANT CHANGE UP (ref 13–17)
IMM GRANULOCYTES NFR BLD AUTO: 0.2 % — SIGNIFICANT CHANGE UP (ref 0–1.5)
LYMPHOCYTES # BLD AUTO: 1.68 K/UL — SIGNIFICANT CHANGE UP (ref 1–3.3)
LYMPHOCYTES # BLD AUTO: 28.7 % — SIGNIFICANT CHANGE UP (ref 13–44)
MCHC RBC-ENTMCNC: 31.6 PG — SIGNIFICANT CHANGE UP (ref 27–34)
MCHC RBC-ENTMCNC: 33.5 GM/DL — SIGNIFICANT CHANGE UP (ref 32–36)
MCV RBC AUTO: 94.2 FL — SIGNIFICANT CHANGE UP (ref 80–100)
MONOCYTES # BLD AUTO: 0.56 K/UL — SIGNIFICANT CHANGE UP (ref 0–0.9)
MONOCYTES NFR BLD AUTO: 9.6 % — SIGNIFICANT CHANGE UP (ref 2–14)
NEUTROPHILS # BLD AUTO: 3.31 K/UL — SIGNIFICANT CHANGE UP (ref 1.8–7.4)
NEUTROPHILS NFR BLD AUTO: 56.6 % — SIGNIFICANT CHANGE UP (ref 43–77)
NRBC # BLD: 0 /100 WBCS — SIGNIFICANT CHANGE UP (ref 0–0)
PLATELET # BLD AUTO: 192 K/UL — SIGNIFICANT CHANGE UP (ref 150–400)
POTASSIUM SERPL-MCNC: 3.7 MMOL/L — SIGNIFICANT CHANGE UP (ref 3.5–5.3)
POTASSIUM SERPL-SCNC: 3.7 MMOL/L — SIGNIFICANT CHANGE UP (ref 3.5–5.3)
RBC # BLD: 4.34 M/UL — SIGNIFICANT CHANGE UP (ref 4.2–5.8)
RBC # FLD: 11.9 % — SIGNIFICANT CHANGE UP (ref 10.3–14.5)
SARS-COV-2 IGG+IGM SERPL QL IA: 0.4 U/ML — SIGNIFICANT CHANGE UP
SARS-COV-2 IGG+IGM SERPL QL IA: NEGATIVE — SIGNIFICANT CHANGE UP
SODIUM SERPL-SCNC: 139 MMOL/L — SIGNIFICANT CHANGE UP (ref 135–145)
WBC # BLD: 5.85 K/UL — SIGNIFICANT CHANGE UP (ref 3.8–10.5)
WBC # FLD AUTO: 5.85 K/UL — SIGNIFICANT CHANGE UP (ref 3.8–10.5)

## 2021-04-05 PROCEDURE — 86769 SARS-COV-2 COVID-19 ANTIBODY: CPT

## 2021-04-05 PROCEDURE — 93005 ELECTROCARDIOGRAM TRACING: CPT

## 2021-04-05 PROCEDURE — 96374 THER/PROPH/DIAG INJ IV PUSH: CPT

## 2021-04-05 PROCEDURE — 36415 COLL VENOUS BLD VENIPUNCTURE: CPT

## 2021-04-05 PROCEDURE — 88304 TISSUE EXAM BY PATHOLOGIST: CPT

## 2021-04-05 PROCEDURE — C9399: CPT

## 2021-04-05 PROCEDURE — 85730 THROMBOPLASTIN TIME PARTIAL: CPT

## 2021-04-05 PROCEDURE — 80053 COMPREHEN METABOLIC PANEL: CPT

## 2021-04-05 PROCEDURE — 74177 CT ABD & PELVIS W/CONTRAST: CPT

## 2021-04-05 PROCEDURE — 96375 TX/PRO/DX INJ NEW DRUG ADDON: CPT

## 2021-04-05 PROCEDURE — 85025 COMPLETE CBC W/AUTO DIFF WBC: CPT

## 2021-04-05 PROCEDURE — 86900 BLOOD TYPING SEROLOGIC ABO: CPT

## 2021-04-05 PROCEDURE — 87635 SARS-COV-2 COVID-19 AMP PRB: CPT

## 2021-04-05 PROCEDURE — 86901 BLOOD TYPING SEROLOGIC RH(D): CPT

## 2021-04-05 PROCEDURE — 99285 EMERGENCY DEPT VISIT HI MDM: CPT | Mod: 25

## 2021-04-05 PROCEDURE — 80048 BASIC METABOLIC PNL TOTAL CA: CPT

## 2021-04-05 PROCEDURE — 85610 PROTHROMBIN TIME: CPT

## 2021-04-05 PROCEDURE — 86850 RBC ANTIBODY SCREEN: CPT

## 2021-04-05 PROCEDURE — 83690 ASSAY OF LIPASE: CPT

## 2021-04-05 PROCEDURE — C1889: CPT

## 2021-04-05 RX ORDER — KETOROLAC TROMETHAMINE 30 MG/ML
1 SYRINGE (ML) INJECTION
Qty: 12 | Refills: 0
Start: 2021-04-05 | End: 2021-04-09

## 2021-04-05 RX ORDER — TRAMADOL HYDROCHLORIDE 50 MG/1
1 TABLET ORAL
Qty: 8 | Refills: 0
Start: 2021-04-05

## 2021-04-05 RX ORDER — POLYETHYLENE GLYCOL 3350 17 G/17G
17 POWDER, FOR SOLUTION ORAL ONCE
Refills: 0 | Status: COMPLETED | OUTPATIENT
Start: 2021-04-05 | End: 2021-04-05

## 2021-04-05 RX ADMIN — Medication 100 GRAM(S): at 17:17

## 2021-04-05 RX ADMIN — ENOXAPARIN SODIUM 40 MILLIGRAM(S): 100 INJECTION SUBCUTANEOUS at 11:03

## 2021-04-05 RX ADMIN — Medication 10 MILLIGRAM(S): at 12:34

## 2021-04-05 RX ADMIN — POLYETHYLENE GLYCOL 3350 17 GRAM(S): 17 POWDER, FOR SOLUTION ORAL at 09:42

## 2021-04-05 RX ADMIN — MORPHINE SULFATE 2 MILLIGRAM(S): 50 CAPSULE, EXTENDED RELEASE ORAL at 17:17

## 2021-04-05 RX ADMIN — Medication 100 GRAM(S): at 05:01

## 2021-04-05 RX ADMIN — MORPHINE SULFATE 2 MILLIGRAM(S): 50 CAPSULE, EXTENDED RELEASE ORAL at 04:34

## 2021-04-05 RX ADMIN — MORPHINE SULFATE 2 MILLIGRAM(S): 50 CAPSULE, EXTENDED RELEASE ORAL at 05:01

## 2021-04-05 NOTE — PROGRESS NOTE ADULT - ASSESSMENT
40y.o. Male s/p lap appy POD#1    -Add miralax  -d/c today
41 yo s/p laparoscopic appendectomy day 1. Pt is doing well. Tolerating diet. Voided and ambulating. Positive flatus. Vitals are stable and pain in minimal. Incisions are dry and intact.    Plan:  Discharge home

## 2021-04-05 NOTE — DISCHARGE NOTE NURSING/CASE MANAGEMENT/SOCIAL WORK - PATIENT PORTAL LINK FT
You can access the FollowMyHealth Patient Portal offered by [facility] by registering at the following website: [email or web address]. By joining [employer]’s FollowMyHealth portal, you will also be able to view your health information using other applications (apps) compatible with our system.

## 2021-04-05 NOTE — PROGRESS NOTE ADULT - SUBJECTIVE AND OBJECTIVE BOX
INTERVAL HPI/OVERNIGHT EVENTS:  Pt resting comfortably. Having difficulty straining to defecate.   Tolerating diet.   +flatus/-BM.   Denies N/V.    MEDICATIONS  (STANDING):  cefoTEtan  IVPB 1 Gram(s) IV Intermittent every 12 hours  enoxaparin Injectable 40 milliGRAM(s) SubCutaneous daily  polyethylene glycol 3350 17 Gram(s) Oral once  sodium chloride 0.45%. 1000 milliLiter(s) (110 mL/Hr) IV Continuous <Continuous>    MEDICATIONS  (PRN):  ketorolac   Injectable 30 milliGRAM(s) IV Push every 6 hours PRN Moderate Pain (4 - 6)  morphine  - Injectable 2 milliGRAM(s) IV Push every 6 hours PRN Severe Pain (7 - 10)  ondansetron Injectable 4 milliGRAM(s) IV Push every 6 hours PRN Nausea    Vital Signs Last 24 Hrs  T(C): 36.6 (05 Apr 2021 05:26), Max: 36.8 (04 Apr 2021 20:40)  T(F): 97.8 (05 Apr 2021 05:26), Max: 98.3 (04 Apr 2021 20:40)  HR: 56 (05 Apr 2021 05:26) (56 - 78)  BP: 107/55 (05 Apr 2021 05:26) (106/59 - 136/61)  BP(mean): 66 (05 Apr 2021 05:26) (66 - 87)  RR: 17 (05 Apr 2021 05:26) (13 - 19)  SpO2: 93% (05 Apr 2021 05:26) (93% - 100%)    Physical:  General: A&Ox3. NAD.  Abdomen: Soft nondistended, Dressing C/D/I.    I&O's Detail    04 Apr 2021 07:01  -  05 Apr 2021 07:00  --------------------------------------------------------  IN:    Lactated Ringers Bolus: 1500 mL  Total IN: 1500 mL    OUT:    Estimated Blood Loss (mL): 10 mL  Total OUT: 10 mL    Total NET: 1490 mL    LABS:                        13.7   5.85  )-----------( 192      ( 05 Apr 2021 05:57 )             40.9             04-05    139  |  105  |  8   ----------------------------<  88  3.7   |  25  |  1.04    Ca    9.0      05 Apr 2021 05:57    TPro  7.9  /  Alb  3.6  /  TBili  0.2  /  DBili  x   /  AST  22  /  ALT  41  /  AlkPhos  74  04-03      
INTERVAL HPI/OVERNIGHT EVENTS:  Pt resting comfortably. No acute complaints. Tolerating diet. Positive flatus. Denies N/V. Ambulating and voiding.    MEDICATIONS  (STANDING):  cefoTEtan  IVPB 1 Gram(s) IV Intermittent every 12 hours  enoxaparin Injectable 40 milliGRAM(s) SubCutaneous daily  sodium chloride 0.45%. 1000 milliLiter(s) (110 mL/Hr) IV Continuous <Continuous>    MEDICATIONS  (PRN):  ketorolac   Injectable 30 milliGRAM(s) IV Push every 6 hours PRN Moderate Pain (4 - 6)  morphine  - Injectable 2 milliGRAM(s) IV Push every 6 hours PRN Severe Pain (7 - 10)  ondansetron Injectable 4 milliGRAM(s) IV Push every 6 hours PRN Nausea      Vital Signs Last 24 Hrs  T(C): 36.6 (05 Apr 2021 05:26), Max: 36.8 (04 Apr 2021 20:40)  T(F): 97.8 (05 Apr 2021 05:26), Max: 98.3 (04 Apr 2021 20:40)  HR: 56 (05 Apr 2021 05:26) (56 - 78)  BP: 107/55 (05 Apr 2021 05:26) (106/59 - 136/61)  BP(mean): 66 (05 Apr 2021 05:26) (66 - 87)  RR: 17 (05 Apr 2021 05:26) (13 - 19)  SpO2: 93% (05 Apr 2021 05:26) (93% - 100%)    Physical:  General: A&Ox3. NAD. Pain is under control.   Abdomen: Soft nondistended, nontender.    I&O's Detail    04 Apr 2021 07:01  -  05 Apr 2021 06:14  --------------------------------------------------------  IN:    Lactated Ringers Bolus: 1500 mL  Total IN: 1500 mL    OUT:    Estimated Blood Loss (mL): 10 mL  Total OUT: 10 mL    Total NET: 1490 mL          LABS:                        13.7   5.85  )-----------( 192      ( 05 Apr 2021 05:57 )             40.9             04-03    137  |  103  |  18  ----------------------------<  89  3.6   |  29  |  1.03    Ca    8.7      03 Apr 2021 23:56    TPro  7.9  /  Alb  3.6  /  TBili  0.2  /  DBili  x   /  AST  22  /  ALT  41  /  AlkPhos  74  04-03      40y.o. Male
pt seen and examined; tender to RLQ and CT scan confirms acute appendicitis. For IR, risks, benefits alternatives discussed, all questions answered agrees to proceed.

## 2021-04-07 LAB — SURGICAL PATHOLOGY STUDY: SIGNIFICANT CHANGE UP

## 2021-04-21 ENCOUNTER — APPOINTMENT (OUTPATIENT)
Dept: SURGERY | Facility: CLINIC | Age: 41
End: 2021-04-21
Payer: MEDICAID

## 2021-04-21 VITALS
BODY MASS INDEX: 23.54 KG/M2 | HEART RATE: 63 BPM | DIASTOLIC BLOOD PRESSURE: 69 MMHG | WEIGHT: 150 LBS | SYSTOLIC BLOOD PRESSURE: 103 MMHG | HEIGHT: 67 IN

## 2021-04-21 DIAGNOSIS — K35.80 UNSPECIFIED ACUTE APPENDICITIS: ICD-10-CM

## 2021-04-21 DIAGNOSIS — Z56.0 UNEMPLOYMENT, UNSPECIFIED: ICD-10-CM

## 2021-04-21 DIAGNOSIS — F32.9 MAJOR DEPRESSIVE DISORDER, SINGLE EPISODE, UNSPECIFIED: ICD-10-CM

## 2021-04-21 DIAGNOSIS — Z78.9 OTHER SPECIFIED HEALTH STATUS: ICD-10-CM

## 2021-04-21 DIAGNOSIS — F41.9 ANXIETY DISORDER, UNSPECIFIED: ICD-10-CM

## 2021-04-21 PROCEDURE — 99024 POSTOP FOLLOW-UP VISIT: CPT

## 2021-04-21 RX ORDER — ESCITALOPRAM OXALATE 5 MG/1
TABLET, FILM COATED ORAL
Refills: 0 | Status: ACTIVE | COMMUNITY

## 2021-04-21 RX ORDER — CLONAZEPAM 2 MG/1
TABLET ORAL
Refills: 0 | Status: ACTIVE | COMMUNITY

## 2021-04-21 SDOH — ECONOMIC STABILITY - INCOME SECURITY: UNEMPLOYMENT, UNSPECIFIED: Z56.0

## 2021-05-11 NOTE — HISTORY OF PRESENT ILLNESS
[de-identified] : SEKOU WOOD is a 40 year old male who presents in the office for postop and post hospitalization visit. Patient was admitted to [facility] for Acute appendicitis. He underwent a Laparoscopic appendectomy on 04/04/2021 pathology results are consistent with Acute suppurative appendicitis with periappendicitis. Today patient is doing well, offers no complaints. Denies any fevers, chills, nausea, vomiting, diarrhea or constipation. Patient able to tolerate regular diet with normal bowel movements. Surgical incisions are healing well. No sign of inflammation or exudate. Patient stated that pain is improved. Patient denies any pain at present time \par

## 2021-05-11 NOTE — PHYSICAL EXAM
[No Rash or Lesion] : No rash or lesion [Alert] : alert [Oriented to Person] : oriented to person [Oriented to Place] : oriented to place [Oriented to Time] : oriented to time [Calm] : calm [de-identified] : The patient is alert, well-groomed, well developed and cheerful.  [de-identified] : Breath sounds equal and bilateral, no wheezing no rales or rhonchi  [de-identified] :  good S1, S2, no m/r/g bilateral  [de-identified] : Normoactive bowel sounds, soft and nontender, no hepatosplenomegaly or masses noted, Patient incision sites healing well  [de-identified] : WNL

## 2021-05-11 NOTE — ASSESSMENT
[FreeTextEntry1] : SEKOU WOOD is a 40 year old male who underwent Laparoscopic appendectomy \par \par Patient is doing well, with expected post-operative recovery. All surgical incisions are healing well and as expected. There is no evidence of infection or complication, and patient is progressing as expected. Post-operative wound care, activity, restrictions and precautions reinforced. path discussed. Patient was instructed no heavy lifting  4 weeks. Patient's questions and concerns addressed to patient's satisfaction. \par

## 2021-10-13 NOTE — DISCHARGE NOTE NURSING/CASE MANAGEMENT/SOCIAL WORK - NSDPLANG ASIS_GEN_ALL_CORE
No
.  Requiring mechanical ventilation, severe respiratory distress that poses a threat to life  -c/w Abx for PNA  -patient rescinded DNI status on admission

## 2024-01-02 ENCOUNTER — NON-APPOINTMENT (OUTPATIENT)
Age: 44
End: 2024-01-02

## 2024-01-23 ENCOUNTER — EMERGENCY (EMERGENCY)
Facility: HOSPITAL | Age: 44
LOS: 1 days | Discharge: HOME | End: 2024-01-23
Attending: EMERGENCY MEDICINE
Payer: COMMERCIAL

## 2024-01-23 VITALS
DIASTOLIC BLOOD PRESSURE: 62 MMHG | TEMPERATURE: 98 F | RESPIRATION RATE: 16 BRPM | OXYGEN SATURATION: 98 % | SYSTOLIC BLOOD PRESSURE: 97 MMHG | HEART RATE: 83 BPM

## 2024-01-23 VITALS
TEMPERATURE: 98 F | SYSTOLIC BLOOD PRESSURE: 107 MMHG | HEART RATE: 68 BPM | RESPIRATION RATE: 16 BRPM | HEIGHT: 67 IN | WEIGHT: 134.48 LBS | DIASTOLIC BLOOD PRESSURE: 80 MMHG | OXYGEN SATURATION: 97 %

## 2024-01-23 LAB
ALBUMIN SERPL ELPH-MCNC: 3.5 G/DL — SIGNIFICANT CHANGE UP (ref 3.5–5)
ALP SERPL-CCNC: 62 U/L — SIGNIFICANT CHANGE UP (ref 40–120)
ALT FLD-CCNC: 58 U/L DA — SIGNIFICANT CHANGE UP (ref 10–60)
ANION GAP SERPL CALC-SCNC: 4 MMOL/L — LOW (ref 5–17)
APPEARANCE UR: CLEAR — SIGNIFICANT CHANGE UP
AST SERPL-CCNC: 29 U/L — SIGNIFICANT CHANGE UP (ref 10–40)
BASOPHILS # BLD AUTO: 0.02 K/UL — SIGNIFICANT CHANGE UP (ref 0–0.2)
BASOPHILS NFR BLD AUTO: 0.4 % — SIGNIFICANT CHANGE UP (ref 0–2)
BILIRUB SERPL-MCNC: 0.5 MG/DL — SIGNIFICANT CHANGE UP (ref 0.2–1.2)
BILIRUB UR-MCNC: NEGATIVE — SIGNIFICANT CHANGE UP
BUN SERPL-MCNC: 15 MG/DL — SIGNIFICANT CHANGE UP (ref 7–18)
CALCIUM SERPL-MCNC: 9.1 MG/DL — SIGNIFICANT CHANGE UP (ref 8.4–10.5)
CHLORIDE SERPL-SCNC: 105 MMOL/L — SIGNIFICANT CHANGE UP (ref 96–108)
CO2 SERPL-SCNC: 28 MMOL/L — SIGNIFICANT CHANGE UP (ref 22–31)
COLOR SPEC: YELLOW — SIGNIFICANT CHANGE UP
CREAT SERPL-MCNC: 0.83 MG/DL — SIGNIFICANT CHANGE UP (ref 0.5–1.3)
DIFF PNL FLD: NEGATIVE — SIGNIFICANT CHANGE UP
EGFR: 113 ML/MIN/1.73M2 — SIGNIFICANT CHANGE UP
EOSINOPHIL # BLD AUTO: 0.44 K/UL — SIGNIFICANT CHANGE UP (ref 0–0.5)
EOSINOPHIL NFR BLD AUTO: 8.1 % — HIGH (ref 0–6)
FLUAV AG NPH QL: SIGNIFICANT CHANGE UP
FLUBV AG NPH QL: SIGNIFICANT CHANGE UP
GLUCOSE SERPL-MCNC: 88 MG/DL — SIGNIFICANT CHANGE UP (ref 70–99)
GLUCOSE UR QL: NEGATIVE MG/DL — SIGNIFICANT CHANGE UP
HCT VFR BLD CALC: 38.2 % — LOW (ref 39–50)
HGB BLD-MCNC: 12.9 G/DL — LOW (ref 13–17)
IMM GRANULOCYTES NFR BLD AUTO: 0.2 % — SIGNIFICANT CHANGE UP (ref 0–0.9)
KETONES UR-MCNC: NEGATIVE MG/DL — SIGNIFICANT CHANGE UP
LACTATE SERPL-SCNC: 0.5 MMOL/L — LOW (ref 0.7–2)
LEUKOCYTE ESTERASE UR-ACNC: NEGATIVE — SIGNIFICANT CHANGE UP
LIDOCAIN IGE QN: 54 U/L — SIGNIFICANT CHANGE UP (ref 13–75)
LYMPHOCYTES # BLD AUTO: 2.2 K/UL — SIGNIFICANT CHANGE UP (ref 1–3.3)
LYMPHOCYTES # BLD AUTO: 40.4 % — SIGNIFICANT CHANGE UP (ref 13–44)
MCHC RBC-ENTMCNC: 30.8 PG — SIGNIFICANT CHANGE UP (ref 27–34)
MCHC RBC-ENTMCNC: 33.8 GM/DL — SIGNIFICANT CHANGE UP (ref 32–36)
MCV RBC AUTO: 91.2 FL — SIGNIFICANT CHANGE UP (ref 80–100)
MONOCYTES # BLD AUTO: 0.46 K/UL — SIGNIFICANT CHANGE UP (ref 0–0.9)
MONOCYTES NFR BLD AUTO: 8.4 % — SIGNIFICANT CHANGE UP (ref 2–14)
NEUTROPHILS # BLD AUTO: 2.32 K/UL — SIGNIFICANT CHANGE UP (ref 1.8–7.4)
NEUTROPHILS NFR BLD AUTO: 42.5 % — LOW (ref 43–77)
NITRITE UR-MCNC: NEGATIVE — SIGNIFICANT CHANGE UP
NRBC # BLD: 0 /100 WBCS — SIGNIFICANT CHANGE UP (ref 0–0)
PH UR: 5.5 — SIGNIFICANT CHANGE UP (ref 5–8)
PLATELET # BLD AUTO: 215 K/UL — SIGNIFICANT CHANGE UP (ref 150–400)
POTASSIUM SERPL-MCNC: 3.5 MMOL/L — SIGNIFICANT CHANGE UP (ref 3.5–5.3)
POTASSIUM SERPL-SCNC: 3.5 MMOL/L — SIGNIFICANT CHANGE UP (ref 3.5–5.3)
PROT SERPL-MCNC: 7.4 G/DL — SIGNIFICANT CHANGE UP (ref 6–8.3)
PROT UR-MCNC: NEGATIVE MG/DL — SIGNIFICANT CHANGE UP
RBC # BLD: 4.19 M/UL — LOW (ref 4.2–5.8)
RBC # FLD: 11.9 % — SIGNIFICANT CHANGE UP (ref 10.3–14.5)
SARS-COV-2 RNA SPEC QL NAA+PROBE: SIGNIFICANT CHANGE UP
SODIUM SERPL-SCNC: 137 MMOL/L — SIGNIFICANT CHANGE UP (ref 135–145)
SP GR SPEC: 1.02 — SIGNIFICANT CHANGE UP (ref 1–1.03)
UROBILINOGEN FLD QL: 1 MG/DL — SIGNIFICANT CHANGE UP (ref 0.2–1)
WBC # BLD: 5.45 K/UL — SIGNIFICANT CHANGE UP (ref 3.8–10.5)
WBC # FLD AUTO: 5.45 K/UL — SIGNIFICANT CHANGE UP (ref 3.8–10.5)

## 2024-01-23 PROCEDURE — 99285 EMERGENCY DEPT VISIT HI MDM: CPT

## 2024-01-23 PROCEDURE — 96366 THER/PROPH/DIAG IV INF ADDON: CPT

## 2024-01-23 PROCEDURE — 36415 COLL VENOUS BLD VENIPUNCTURE: CPT

## 2024-01-23 PROCEDURE — 81003 URINALYSIS AUTO W/O SCOPE: CPT

## 2024-01-23 PROCEDURE — 74177 CT ABD & PELVIS W/CONTRAST: CPT | Mod: MA

## 2024-01-23 PROCEDURE — 85025 COMPLETE CBC W/AUTO DIFF WBC: CPT

## 2024-01-23 PROCEDURE — 96375 TX/PRO/DX INJ NEW DRUG ADDON: CPT

## 2024-01-23 PROCEDURE — 83605 ASSAY OF LACTIC ACID: CPT

## 2024-01-23 PROCEDURE — 99284 EMERGENCY DEPT VISIT MOD MDM: CPT | Mod: 25

## 2024-01-23 PROCEDURE — 83690 ASSAY OF LIPASE: CPT

## 2024-01-23 PROCEDURE — 74177 CT ABD & PELVIS W/CONTRAST: CPT | Mod: 26,MA

## 2024-01-23 PROCEDURE — 96365 THER/PROPH/DIAG IV INF INIT: CPT | Mod: XU

## 2024-01-23 PROCEDURE — 87637 SARSCOV2&INF A&B&RSV AMP PRB: CPT

## 2024-01-23 PROCEDURE — 80053 COMPREHEN METABOLIC PANEL: CPT

## 2024-01-23 RX ORDER — FAMOTIDINE 10 MG/ML
20 INJECTION INTRAVENOUS ONCE
Refills: 0 | Status: COMPLETED | OUTPATIENT
Start: 2024-01-23 | End: 2024-01-23

## 2024-01-23 RX ORDER — ONDANSETRON 8 MG/1
4 TABLET, FILM COATED ORAL ONCE
Refills: 0 | Status: DISCONTINUED | OUTPATIENT
Start: 2024-01-23 | End: 2024-01-23

## 2024-01-23 RX ORDER — METOCLOPRAMIDE HCL 10 MG
10 TABLET ORAL ONCE
Refills: 0 | Status: COMPLETED | OUTPATIENT
Start: 2024-01-23 | End: 2024-01-23

## 2024-01-23 RX ORDER — SODIUM CHLORIDE 9 MG/ML
1000 INJECTION INTRAMUSCULAR; INTRAVENOUS; SUBCUTANEOUS ONCE
Refills: 0 | Status: COMPLETED | OUTPATIENT
Start: 2024-01-23 | End: 2024-01-23

## 2024-01-23 RX ORDER — IOHEXOL 300 MG/ML
30 INJECTION, SOLUTION INTRAVENOUS ONCE
Refills: 0 | Status: COMPLETED | OUTPATIENT
Start: 2024-01-23 | End: 2024-01-23

## 2024-01-23 RX ADMIN — Medication 10 MILLIGRAM(S): at 12:03

## 2024-01-23 RX ADMIN — SODIUM CHLORIDE 1000 MILLILITER(S): 9 INJECTION INTRAMUSCULAR; INTRAVENOUS; SUBCUTANEOUS at 12:04

## 2024-01-23 RX ADMIN — SODIUM CHLORIDE 1000 MILLILITER(S): 9 INJECTION INTRAMUSCULAR; INTRAVENOUS; SUBCUTANEOUS at 08:17

## 2024-01-23 RX ADMIN — IOHEXOL 30 MILLILITER(S): 300 INJECTION, SOLUTION INTRAVENOUS at 08:29

## 2024-01-23 RX ADMIN — Medication 104 MILLIGRAM(S): at 08:18

## 2024-01-23 RX ADMIN — FAMOTIDINE 20 MILLIGRAM(S): 10 INJECTION INTRAVENOUS at 08:18

## 2024-01-23 NOTE — ED PROVIDER NOTE - NSFOLLOWUPINSTRUCTIONS_ED_ALL_ED_FT
Please follow up with your primary care doctor in 1-2 days.  Please use acetaminophen as needed for pain.  You may try over-the-counter stomach soothing medications such as famotidine or Maalox as needed for more pain.  Please return to the emergency department if you have worsening pain, chest pain, shortness of breath, vomiting, fever, black or bloody stool or urine, trouble urinating, or any other symptoms.      Abdominal Pain    WHAT YOU NEED TO KNOW:    Abdominal pain can be dull, achy, or sharp. You may have pain in one area of your abdomen, or in your entire abdomen. Your pain may be caused by a condition such as constipation, food sensitivity or poisoning, infection, or a blockage. Abdominal pain can also be from a hernia, appendicitis, or an ulcer. Liver, gallbladder, or kidney conditions can also cause abdominal pain. The cause of your abdominal pain may be unknown.     DISCHARGE INSTRUCTIONS:    Return to the emergency department if:   •You have new chest pain or shortness of breath.  •You have pulsing pain in your upper abdomen or lower back that suddenly becomes constant.  •Your pain is in the right lower abdominal area and worsens with movement.   •You have a fever over 100.4°F (38°C) or shaking chills.   •You are vomiting and cannot keep food or liquids down.   •Your pain does not improve or gets worse over the next 8 to 12 hours.   •You see blood in your vomit or bowel movements, or they look black and tarry.   •Your skin or the whites of your eyes turn yellow.   •You are a woman and have a large amount of vaginal bleeding that is not your monthly period.     Contact your healthcare provider if:   •You have pain in your lower back.   •You are a man and have pain in your testicles.  •You have pain when you urinate.   •You have questions or concerns about your condition or care.    Follow up with your healthcare provider within 24 hours or as directed: Write down your questions so you remember to ask them during your visits.     Medicines:   •Medicines may be given to calm your stomach and prevent vomiting or to decrease pain. Ask how to take pain medicine safely.  •Take your medicine as directed. Contact your healthcare provider if you think your medicine is not helping or if you have side effects. Tell him of her if you are allergic to any medicine. Keep a list of the medicines, vitamins, and herbs you take. Include the amounts, and when and why you take them. Bring the list or the pill bottles to follow-up visits. Carry your medicine list with you in case of an emergency.

## 2024-01-23 NOTE — ED ADULT NURSE NOTE - NSFALLUNIVINTERV_ED_ALL_ED
Bed/Stretcher in lowest position, wheels locked, appropriate side rails in place/Call bell, personal items and telephone in reach/Instruct patient to call for assistance before getting out of bed/chair/stretcher/Non-slip footwear applied when patient is off stretcher/[place] to call system/Physically safe environment - no spills, clutter or unnecessary equipment/Purposeful proactive rounding/Room/bathroom lighting operational, light cord in reach

## 2024-01-23 NOTE — ED ADULT NURSE NOTE - OBJECTIVE STATEMENT
pt is here for abdominal pain.  pt stated that diffuse abdominal pain x 4 days, denied N/V/D or fever, denied chest pain or sob,

## 2024-01-23 NOTE — ED PROVIDER NOTE - PHYSICAL EXAMINATION
Afebrile, hemodynamically stable, saturating well on room air  NAD, nontoxic appearing, laying comfortably in bed, no WOB, speaking full sentences  Head NCAT  EOMI grossly, anicteric  MMM  RRR, nml S1/S2, no m/r/g  Lungs CTAB, no w/r/r  Abd soft, mild generalized TTP, ND, nml BS, no rebound or guarding  AAO, CN's 3-12 grossly intact  HIGH spontaneously, no leg cyanosis or edema  Skin warm, well perfused, no rashes or hives

## 2024-01-23 NOTE — ED PROVIDER NOTE - OBJECTIVE STATEMENT
43-year-old male with history of anxiety, depression, on clonazepam, BPH, past appendectomy, presents with generalized abdominal discomfort described as a pressure-like sensation since Saturday. Associated nausea. On review of systems reports some urinary hesitancy, uncertain if acute. Denies all other symptoms including other urinary, actual vomiting, diarrhea, black or bloody stool, cough, fever, testicular pain or swelling.

## 2024-01-23 NOTE — ED PROVIDER NOTE - PATIENT PORTAL LINK FT
You can access the FollowMyHealth Patient Portal offered by Health system by registering at the following website: [email or web address]. By joining [email or web address]’s [email or web address] portal, you will also be able to view your health information using other applications (apps) compatible with our system.

## 2024-01-23 NOTE — ED PROVIDER NOTE - CLINICAL SUMMARY MEDICAL DECISION MAKING FREE TEXT BOX
Abdomen benign and nonperitoneal, with low suspicion for acute intra-abdominal process, and CT _____________. No CP/SOB and character low suspicion for ACS or PE to warrant workup for this. No signs or symptoms of testicular torsion. Character could be consistent with GI upset. Appears well-hydrated and labs ________ and does not require admission for IV hydration. Given IV fluids, Pepcid, Reglan with Abdomen benign and nonperitoneal, with low suspicion for acute intra-abdominal process, and CT unremarkable and symptoms resolved with meds and rpt exam NT. No CP/SOB and character low suspicion for ACS or PE to warrant workup for this. No signs or symptoms of testicular torsion. Character could be consistent with GI upset. Appears well-hydrated and labs unremarkable and does not require admission for IV hydration. Given IV fluids, Pepcid, Reglan with resolution of symptoms. Patient is well appearing, NAD, afebrile, hemodynamically stable. Any available tests and studies were discussed with patient. Discharged with instructions in further symptomatic care, return precautions, and need for GI f/u.

## 2024-12-04 ENCOUNTER — EMERGENCY (EMERGENCY)
Facility: HOSPITAL | Age: 44
LOS: 1 days | Discharge: HOME | End: 2024-12-04
Attending: EMERGENCY MEDICINE
Payer: COMMERCIAL

## 2024-12-04 VITALS
RESPIRATION RATE: 19 BRPM | OXYGEN SATURATION: 97 % | HEART RATE: 82 BPM | HEIGHT: 67 IN | DIASTOLIC BLOOD PRESSURE: 74 MMHG | WEIGHT: 130.07 LBS | TEMPERATURE: 99 F | SYSTOLIC BLOOD PRESSURE: 113 MMHG

## 2024-12-04 PROCEDURE — 71046 X-RAY EXAM CHEST 2 VIEWS: CPT | Mod: 26

## 2024-12-04 PROCEDURE — 99284 EMERGENCY DEPT VISIT MOD MDM: CPT

## 2024-12-04 RX ORDER — DEXAMETHASONE 0.5 MG/1
10 TABLET ORAL ONCE
Refills: 0 | Status: COMPLETED | OUTPATIENT
Start: 2024-12-04 | End: 2024-12-04

## 2024-12-04 RX ORDER — AZITHROMYCIN 250 MG
500 CAPSULE ORAL ONCE
Refills: 0 | Status: COMPLETED | OUTPATIENT
Start: 2024-12-04 | End: 2024-12-04

## 2024-12-04 RX ORDER — ACETAMINOPHEN 500 MG/5ML
650 LIQUID (ML) ORAL ONCE
Refills: 0 | Status: DISCONTINUED | OUTPATIENT
Start: 2024-12-04 | End: 2024-12-08

## 2024-12-04 RX ORDER — AZITHROMYCIN 250 MG
1 CAPSULE ORAL
Qty: 4 | Refills: 0
Start: 2024-12-04 | End: 2024-12-07

## 2024-12-04 RX ADMIN — DEXAMETHASONE 10 MILLIGRAM(S): 0.5 TABLET ORAL at 23:18

## 2024-12-05 LAB
FLUAV AG NPH QL: SIGNIFICANT CHANGE UP
FLUBV AG NPH QL: SIGNIFICANT CHANGE UP
RSV RNA NPH QL NAA+NON-PROBE: SIGNIFICANT CHANGE UP
SARS-COV-2 RNA SPEC QL NAA+PROBE: SIGNIFICANT CHANGE UP

## 2024-12-05 PROCEDURE — 71046 X-RAY EXAM CHEST 2 VIEWS: CPT

## 2024-12-05 PROCEDURE — 87637 SARSCOV2&INF A&B&RSV AMP PRB: CPT

## 2024-12-05 PROCEDURE — 99283 EMERGENCY DEPT VISIT LOW MDM: CPT | Mod: 25

## 2024-12-05 RX ORDER — IBUPROFEN 200 MG
1 TABLET ORAL
Qty: 9 | Refills: 0
Start: 2024-12-05 | End: 2024-12-07

## 2024-12-05 RX ADMIN — Medication 500 MILLIGRAM(S): at 00:11
